# Patient Record
Sex: MALE | Race: WHITE
[De-identification: names, ages, dates, MRNs, and addresses within clinical notes are randomized per-mention and may not be internally consistent; named-entity substitution may affect disease eponyms.]

---

## 2017-09-16 NOTE — EDM.PDOC
ED HPI GENERAL MEDICAL PROBLEM





- General


Chief Complaint: Genitourinary Problem


Stated Complaint: RIGHT FLANK PAIN


Time Seen by Provider: 09/16/17 19:20


Source of Information: Reports: Patient


History Limitations: Reports: No Limitations





- History of Present Illness


INITIAL COMMENTS - FREE TEXT/NARRATIVE: 





This patient is a 62 year old male that presents to the ER. Patient reports 

that for about the last 2 weeks that he has had right flank pain that comes and 

goes. he reports today the pain was worse than what it has. The patient reports 

today he noticed there was a dark color urine that he thought could be blood in 

his urine. The patient does report the pain in his lower right back is worse 

with movement. Patient denies any injury. Patient denies ha, dizziness, v, n, d

, f, cp, soa, abd pain, bowel changes, urinary/bowel incontinence, testicle pain

, penis pain. 


Onset Date: 09/02/17


Duration: Getting Worse


Location: Reports: Back (Right flank)


Severity: Moderate


Improves with: Reports: None


Worsens with: Reports: None


Associated Symptoms: Denies: Confusion, Chest Pain, Cough, cough w sputum, 

Diaphoresis, Fever/Chills, Headaches, Loss of Appetite, Nausea/Vomiting, Rash, 

Seizure, Shortness of Breath, Weakness


  ** Right Flank


Pain Score (Numeric/FACES): 3





- Related Data


 Allergies











Allergy/AdvReac Type Severity Reaction Status Date / Time


 


codeine Allergy  Abdominal Verified 09/16/17 19:18





   Cramps  











Home Meds: 


 Home Meds





Aspirin [Halfprin] 81 mg PO DAILY 06/30/15 [History]


Cholecalciferol (Vitamin D3) [Vitamin D3] 5,000 units PO DAILY 06/30/15 [History

]


Ibuprofen [Advil] 200 mg PO DAILY PRN 06/30/15 [History]


Meloxicam [Meloxicam] 7.5 mg PO DAILY 06/30/15 [History]


Omeprazole [Omeprazole] 20 mg PO DAILY 06/30/15 [History]


Pregabalin [Lyrica] 75 mg PO DAILY 06/30/15 [History]


Simvastatin [Simvastatin] 10 mg PO DAILY 06/30/15 [History]


Acetaminophen [Tylenol] 325 - 650 mg PO Q4H PRN 04/07/16 [History]


Multivitamin [Daily Multiple Vitamin] 1 tab PO DAILY 04/07/16 [History]











Past Medical History


Cardiovascular History: Reports: High Cholesterol


Musculoskeletal History: Reports: Back Pain, Chronic





- Infectious Disease History


Infectious Disease History: Reports: MRSA, Other (See Below)


Other Infectious Disease History: Clearance #1, needs 2 more nasal cxs





- Past Surgical History


GI Surgical History: Reports: Cholecystectomy


Musculoskeletal Surgical History: Reports: Arthroscopic Knee, Arthroscopic 

Procedure





Social & Family History





- Tobacco Use


Smoking Status *Q: Former Smoker


Used Tobacco, but Quit: Yes


Month Tobacco Last Used: 5 YEARS AGO





- Caffeine Use


Caffeine Use: Reports: Coffee





- Recreational Drug Use


Recreational Drug Use: No





- Living Situation & Occupation


Living situation: Reports: 


Occupation: Employed





ED ROS GENERAL





- Review of Systems


Review Of Systems: See Below


Constitutional: Reports: No Symptoms


HEENT: Reports: No Symptoms


Respiratory: Reports: No Symptoms


Cardiovascular: Reports: No Symptoms


Endocrine: Reports: No Symptoms


GI/Abdominal: Reports: No Symptoms


: Reports: Flank Pain (Right), Hematuria


Musculoskeletal: Reports: No Symptoms


Skin: Reports: No Symptoms


Neurological: Reports: No Symptoms


Psychiatric: Reports: No Symptoms


Hematologic/Lymphatic: Reports: No Symptoms


Immunologic: Reports: No Symptoms





ED EXAM, GI/ABD





- Physical Exam


Exam: See Below


Exam Limited By: No Limitations


General Appearance: Alert, WD/WN, No Apparent Distress


Eyes: Bilateral: Normal Appearance


Ears: Normal External Exam, Normal Canal, Hearing Grossly Normal, Normal TMs


Nose: Normal Inspection, Normal Mucosa, No Blood


Throat/Mouth: Normal Inspection, Normal Lips, Normal Teeth, Normal Gums, Normal 

Oropharynx, Normal Voice, No Airway Compromise


Neck: Normal Inspection, Supple, Non-Tender, Full Range of Motion


Respiratory/Chest: No Respiratory Distress, Lungs Clear, Normal Breath Sounds, 

No Accessory Muscle Use


Cardiovascular: Normal Peripheral Pulses, Regular Rate, Rhythm, No Edema, No 

Gallop, No JVD, No Murmur, No Rub


GI/Abdominal Exam: Normal Bowel Sounds, Soft, Non-Tender, No Organomegaly, No 

Distention, No Abnormal Bruit, No Mass


Back Exam: Normal Inspection, Full Range of Motion, CVA Tenderness (R), Other (

Pain Right flank does increase with twisting of the trunk per patient. ).  No: 

CVA Tenderness (L), Decreased Range of Motion, Muscle Spasm, Paraspinal 

Tenderness, Vertebral Tenderness


Extremities: Normal Inspection, Normal Range of Motion, Non-Tender, No Pedal 

Edema, Normal Capillary Refill


Neurological: Alert, Oriented


Psychiatric: Normal Affect, Normal Mood


Skin Exam: Warm, Dry, Intact, Normal Color, No Rash


Lymphatic: No Adenopathy





Course





- Vital Signs


Last Recorded V/S: 


 Last Vital Signs











Temp  97.8 F   09/16/17 19:06


 


Pulse  61   09/16/17 19:06


 


Resp  16   09/16/17 19:06


 


BP  148/66 H  09/16/17 19:06


 


Pulse Ox  94 L  09/16/17 19:06














- Orders/Labs/Meds


Labs: 


 Laboratory Tests











  09/16/17 09/16/17 09/16/17 Range/Units





  19:19 19:19 19:19 


 


WBC  9.1    (5.0-10.0)  10^3/uL


 


RBC  5.08    (4.50-6.00)  10^6/uL


 


Hgb  14.6    (14.0-18.0)  g/dL


 


Hct  44.2    (40.0-54.0)  %


 


MCV  87.0    (82.0-94.0)  fL


 


MCH  28.7    (27.0-32.0)  pg


 


MCHC  33.0    (33.0-38.0)  g/dL


 


RDW Coeff of So  13.5    (11.0-15.0)  %


 


Plt Count  241    (150-400)  10^3/uL


 


Neut % (Auto)  62.8    (35-85)  %


 


Lymph % (Auto)  27.2    (10-55)  %


 


Mono % (Auto)  8.5    (0-16)  %


 


Eos % (Auto)  1.3    (0-5)  %


 


Baso % (Auto)  0.2    (0-3)  %


 


Neut # (Auto)  5.71    (1.80-7.00)  10^3/uL


 


Lymph # (Auto)  2.47    (1.00-4.80)  10^3/uL


 


Mono # (Auto)  0.77    (0.00-0.80)  10^3/uL


 


Eos # (Auto)  0.12    (0.00-0.45)  10^3/uL


 


Baso # (Auto)  0.02    10^3/uL


 


Sodium   141   (136-145)  mEq/L


 


Potassium   3.8   (3.5-5.0)  mEq/L


 


Chloride   105   ()  mEq/L


 


Carbon Dioxide   26   (21-32)  mmol/L


 


BUN   16   (7-18)  mg/dL


 


Creatinine   1.0   (0.7-1.3)  mg/dL


 


Est Cr Clr Drug Dosing   TNP   


 


Estimated GFR (MDRD)   > 60   (>=60)  mL/min


 


Glucose   136 H D   (75-99)  mg/dL


 


Calcium   9.0   (8.4-10.1)  mg/dL


 


Total Bilirubin   0.9   (0.0-1.0)  mg/dL


 


AST   22   (15-37)  U/L


 


ALT   29   (12-78)  U/L


 


Alkaline Phosphatase   103   ()  U/L


 


Total Protein   7.6   (6.4-8.2)  g/dL


 


Albumin   3.8   (3.4-5.0)  g/dL


 


Urine Color    Dark yellow  (YELLOW)  


 


Urine Appearance    Clear  (CLEAR)  


 


Urine pH    6.0  (4.5-8.0)  


 


Ur Specific Gravity    1.025 H  (1.003-1.020)  


 


Urine Protein    Trace H  (NEGATIVE)  mg/dL


 


Urine Glucose (UA)    Negative  (NEGATIVE)  mg/dL


 


Urine Ketones    15 H  (NEGATIVE)  mg/dL


 


Urine Occult Blood    Trace-lysed H  (NEGATIVE)  


 


Urine Nitrite    Negative  (NEGATIVE)  


 


Urine Bilirubin    Negative  (NEGATIVE)  


 


Urine Urobilinogen    1.0  (0.2-1.0)  EU/dL


 


Ur Leukocyte Esterase    Negative  (NEGATIVE)  


 


Urine RBC    0-5  (0-5)  /HPF


 


Urine WBC    Not seen  (0-5)  /HPF


 


Ur Squamous Epith Cells    Occasional H  (NOT SEEN)  /HPF


 


Urine Bacteria    Occasional H  (NOT SEEN)  /HPF


 


Urine Mucus    Moderate H  (NOT SEEN)  /HPF











Meds: 


Medications














Discontinued Medications














Generic Name Dose Route Start Last Admin





  Trade Name Freq  PRN Reason Stop Dose Admin


 


Hydrocodone Bitart/Acetaminophen  3 packet  09/16/17 21:24  09/16/17 22:03





  Take Home: Acetaminophen/Hydrocod, 2 Tab Pack  PO  09/16/17 21:25  3 packet





  ONETIME ONE   Administration


 


Cyclobenzaprine HCl  1 packet  09/16/17 21:25  09/16/17 22:03





  Take Home: Cyclobenzaprine 10 Mg, 4 Tab Pack  PO  09/16/17 21:26  1 packet





  ONETIME ONE   Administration


 


Sodium Chloride  1,000 mls @ 1,000 mls/hr  09/16/17 19:45  





  Normal Saline  IV  09/16/17 20:44  





  .BOLUS ONE   


 


Ketorolac Tromethamine  30 mg  09/16/17 19:45  





  Toradol  IVPUSH  09/16/17 19:46  





  ONETIME ONE   


 


Tamsulosin HCl  0.4 mg  09/16/17 19:45  





  Flomax  PO  09/16/17 19:46  





  ONETIME ONE   














- Radiology Interpretation


Free Text/Narrative:: 





Renal CT; Liver cyst that was present in 2009 scan, no concern. No ureter stone

, no hydronephrosis. Discussed with radiologist. 


CT Results Date: 09/16/17


CT Results Time: 21:22





Departure





- Departure


Time of Disposition: 21:23


Disposition: Home, Self-Care 01


Condition: Good


Clinical Impression: 


Lumbar strain


Qualifiers:


 Encounter type: initial encounter Qualified Code(s): S39.012A - Strain of 

muscle, fascia and tendon of lower back, initial encounter








- Discharge Information


Instructions:  Back Injury Prevention, Easy-to-Read


Referrals: 


Benedicto Rowan MD [Primary Care Provider] - 


Forms:  ED Department Discharge


Additional Instructions: 


Followup with your primary care provider 


Return to the ER for worsening of condition or any emergent concerns


Norco 5/325mg 1-2 pills every 4-6 hours as needed for pain #12 no refill #6 

take home


Flexeril 10mg 1 pill three times a day as needed for muscle spasm #12 no refill 

#4 take home


Increase fluids





- Assessment/Plan


Plan: 





PLEASE SEE RN NOTE FOR PFSH.

## 2018-04-16 NOTE — OR
DATE OF OPERATION:  04/13/2018

 

PREOPERATIVE DIAGNOSIS:  FOLLOW UP POLYPS.

 

POSTOPERATIVE DIAGNOSIS:  FOLLOW UP POLYPS.

 

SURGEON:  Fan Lott MD

 

PROCEDURE:  FULL-LENGTH COLONOSCOPY WITH FORCEPS POLYP REMOVAL X2.

 

ANESTHESIA:  Conscious sedation.

 

COMPLICATIONS:  None.

 

SPECIMEN:  Two small hyperplastic polyps.

 

FINDINGS:

1. Full-length colonoscopy.

2. Hyperplastic polyps x2 distal sigmoid, rectosigmoid junction.

 

RECOMMENDATIONS:  Follow up colonoscopy in 3 years.

 

INDICATIONS:  The patient had a large tubulovillous lesion removed from the

perianal area 2 years ago.  He is overdue for a followup.  He is supposed to

have a one year follow up procedure.  We are at two years.  Dr. Rowan sent him

for colonoscopy.

 

DESCRIPTION OF PROCEDURE:  The patient was prepped and draped, placed in the

left lateral decubitus position.  A lubricated Olympus colonoscope was inserted

and easily advanced to the cecum.  Direct visualization of the ileocecal valve

and appendiceal orifice was accomplished.  Bowel prep was fine.  Upon

withdrawal, the right transverse and descending colons were benign.  In the

sigmoid area, the patient had no signs of any vascular abnormalities, colitis,

diverticula, polyps, mass or otherwise.  At the rectosigmoid junction and into

the proximal rectal vault, the patient had 2 small flat hyperplastic polyps,

both removed with forceps in their entirety without complication.  The rectal

vault appeared benign.  Retroflexion did get an excellent look at the perianal

area, it showed no recurrence of the

polyp or residual polyp left from his prior removal.  Air was then suctioned.

Scope was removed without complication.

 

DIANNA/RIGO

DD:  04/13/2018 10:14:05

DT:  04/13/2018 18:18:17

Job #:  [id number]

## 2019-11-21 ENCOUNTER — HOSPITAL ENCOUNTER (EMERGENCY)
Dept: HOSPITAL 38 - CC.ED | Age: 64
Discharge: HOME | End: 2019-11-21
Payer: COMMERCIAL

## 2019-11-21 VITALS — HEART RATE: 53 BPM | DIASTOLIC BLOOD PRESSURE: 79 MMHG | SYSTOLIC BLOOD PRESSURE: 132 MMHG

## 2019-11-21 DIAGNOSIS — E78.00: ICD-10-CM

## 2019-11-21 DIAGNOSIS — Z90.49: ICD-10-CM

## 2019-11-21 DIAGNOSIS — Z88.5: ICD-10-CM

## 2019-11-21 DIAGNOSIS — K21.9: Primary | ICD-10-CM

## 2019-11-21 DIAGNOSIS — Z79.899: ICD-10-CM

## 2019-11-21 DIAGNOSIS — Z79.82: ICD-10-CM

## 2019-11-21 DIAGNOSIS — Z87.891: ICD-10-CM

## 2019-11-21 LAB
CHLORIDE SERPL-SCNC: 106 MEQ/L (ref 98–106)
SODIUM SERPL-SCNC: 142 MEQ/L (ref 136–145)

## 2019-11-21 NOTE — EDM.PDOC
ED HPI GENERAL MEDICAL PROBLEM





- General


Chief Complaint: Chest Pain


Stated Complaint: chest pain


Time Seen by Provider: 11/21/19 11:15





- History of Present Illness


INITIAL COMMENTS - FREE TEXT/NARRATIVE: 





Was at work when he developed central chest pain that radiated up into his jaw.

  He did take 4 baby ASA.  He felt nauseated for a short time.  He does have 

history of GERD and has been taking his meds.  Wife relates that he sees Dr. Rowan and this past summer he was having some pain like this and he had stress 

test and then had a cardiolyte both of which were negative.  He has had vita 

in the past.  Did not eat anything unusual today.  When he arrived in the ER he 

states that his pain was down to a 1.


Onset: Today


Duration: Improving


Location: Reports: Chest


Treatments PTA: Reports: Aspirin


  ** Middle Chest


Pain Score (Numeric/FACES): 1





- Related Data


 Allergies











Allergy/AdvReac Type Severity Reaction Status Date / Time


 


codeine Allergy  Abdominal Verified 11/21/19 10:38





   Cramps  











Home Meds: 


 Home Meds





Aspirin [Halfprin] 81 mg PO DAILY 06/30/15 [History]


Cholecalciferol (Vitamin D3) [Vitamin D3] 5,000 units PO DAILY 06/30/15 [History

]


Ibuprofen [Advil] 200 mg PO DAILY PRN 06/30/15 [History]


Meloxicam 7.5 mg PO DAILY 06/30/15 [History]


Simvastatin 10 mg PO DAILY 06/30/15 [History]


Acetaminophen [Tylenol] 325 - 650 mg PO Q4H PRN 04/07/16 [History]


Multivitamin [Daily Multiple Vitamin] 1 tab PO DAILY 04/07/16 [History]


Naproxen Sodium [Aleve] 220 mg PO BID PRN 04/12/18 [History]


Tamsulosin [Tamsulosin 24 Hr] 0.4 mg PO DAILY 04/12/18 [History]


Folic Acid 1 mg PO DAILY 04/13/18 [History]


Metoprolol Succinate 50 mg PO DAILY 11/21/19 [History]











Past Medical History


HEENT History: Reports: Other (See Below)


Cardiovascular History: Reports: High Cholesterol


Genitourinary History: Reports: Other (See Below)


Other Genitourinary History: enlarged prostate


Musculoskeletal History: Reports: Back Pain, Chronic


Oncologic (Cancer) History: Reports: Other (See Below)


Other Oncologic History: lip cancer





- Infectious Disease History


Infectious Disease History: Reports: C-Difficile, MRSA, Other (See Below)


Other Infectious Disease History: Clearance #1, needs 2 more nasal cxs





- Past Surgical History


Other HEENT Surgeries/Procedures: sinus surgery


GI Surgical History: Reports: Cholecystectomy


Musculoskeletal Surgical History: Reports: Arthroscopic Knee, Arthroscopic 

Procedure





Social & Family History





- Tobacco Use


Smoking Status *Q: Former Smoker


Years of Tobacco use: 10


Packs/Tins Daily: 0.5


Used Tobacco, but Quit: Yes


Month/Year Tobacco Last Used: 10





- Caffeine Use


Caffeine Use: Reports: Coffee





- Recreational Drug Use


Recreational Drug Use: No





- Living Situation & Occupation


Living situation: Reports: 


Occupation: Employed





ED ROS GENERAL





- Review of Systems


Review Of Systems: See Below


Constitutional: Denies: Fever, Chills, Weakness


HEENT: Reports: No Symptoms


Respiratory: Denies: Shortness of Breath


Cardiovascular: Reports: Chest Pain.  Denies: Edema, Lightheadedness


GI/Abdominal: Reports: Other (heart burn)


Musculoskeletal: Reports: No Symptoms


Skin: Reports: No Symptoms


Neurological: Reports: No Symptoms





ED EXAM, GENERAL





- Physical Exam


Exam: See Below


Exam Limited By: No Limitations


General Appearance: Alert, WD/WN, Mild Distress


Ears: Normal External Exam, Normal TMs


Throat/Mouth: Normal Inspection, Normal Oropharynx, No Airway Compromise


Head: Atraumatic, Normocephalic


Neck: Normal Inspection, Supple, Non-Tender, Full Range of Motion


Respiratory/Chest: No Respiratory Distress, Lungs Clear, Normal Breath Sounds


Cardiovascular: Normal Peripheral Pulses, Regular Rate, Rhythm, No Edema


GI/Abdominal: Normal Bowel Sounds, Soft, Tender (to the midepigastric area with 

any palpation.)


Back Exam: Normal Inspection


Extremities: Normal Inspection, No Pedal Edema


Neurological: Alert, Oriented


Psychiatric: Normal Affect


Skin Exam: Warm, Dry, Intact





Course





- Vital Signs


Last Recorded V/S: 


 Last Vital Signs











Temp  96 F   11/21/19 11:14


 


Pulse  53 L  11/21/19 11:14


 


Resp  10 L  11/21/19 11:14


 


BP  132/79   11/21/19 11:14


 


Pulse Ox  93 L  11/21/19 10:59














- Orders/Labs/Meds


Orders: 


 Active Orders 24 hr











 Category Date Time Status


 


 Chest 2V [CR] Stat Exams  11/21/19 09:46 Taken











Labs: 


 Laboratory Tests











  11/21/19 11/21/19 11/21/19 Range/Units





  10:51 10:51 10:51 


 


WBC  8.5    (5.0-10.0)  10^3/uL


 


RBC  4.74    (4.50-6.00)  10^6/uL


 


Hgb  14.0    (14.0-18.0)  g/dL


 


Hct  42.6    (40.0-54.0)  %


 


MCV  89.9    (82.0-94.0)  fL


 


MCH  29.5    (27.0-32.0)  pg


 


MCHC  32.9 L    (33.0-38.0)  g/dL


 


RDW Coeff of So  12.7    (11.0-15.0)  %


 


Plt Count  276    (150-400)  10^3/uL


 


Neut % (Auto)  63.2    (35-85)  %


 


Lymph % (Auto)  24.1    (10-55)  %


 


Mono % (Auto)  10.0    (0-16)  %


 


Eos % (Auto)  2.2    (0-5)  %


 


Baso % (Auto)  0.5    (0-3)  %


 


Neut # (Auto)  5.39    (1.80-7.00)  10^3/uL


 


Lymph # (Auto)  2.05    (1.00-4.80)  10^3/uL


 


Mono # (Auto)  0.85 H    (0.00-0.80)  10^3/uL


 


Eos # (Auto)  0.19    (0.00-0.45)  10^3/uL


 


Baso # (Auto)  0.04    10^3/uL


 


PT   10.6   (9.7-12.3)  SEC


 


INR   1.03   (0.92-1.18)  


 


APTT   27.3   (23.2-32.3)  SEC


 


Sodium    142  (136-145)  mEq/L


 


Potassium    4.3  (3.5-5.0)  mEq/L


 


Chloride    106  ()  mEq/L


 


Carbon Dioxide    27  (21-32)  mmol/L


 


BUN    15  (7-18)  mg/dL


 


Creatinine    0.8  (0.7-1.3)  mg/dL


 


Est Cr Clr Drug Dosing    91.44  mL/min


 


Estimated GFR (MDRD)    > 60  (>=60)  mL/min


 


Glucose    101 H  (75-99)  mg/dL


 


Calcium    9.5  (8.4-10.1)  mg/dL


 


Lactate Dehydrogenase    201 H  (100-190)  U/L


 


Creatine Kinase    165  ()  U/L


 


Troponin I    < 0.017  (0.00-0.06)  ng/mL














- Re-Assessments/Exams


Free Text/Narrative Re-Assessment/Exam: 





11/21/19 1100 Discussed normal lab results, EKG and relief of pain.  Discussed 

that with normal cardiolyte within last 4-6 months it is unlikely this is 

cardiac related.  Continue with the protonix.  IF pain reoccurs then would need 

to follow up with PCP for possible EGD and other testing.  Wife and pt both 

voice understanding.  Will start Carafate for 2 weeks at this time.








Departure





- Departure


Time of Disposition: 11:14


Disposition: Home, Self-Care 01


Condition: Good


Clinical Impression: 


Gastroesophageal reflux disease


Qualifiers:


 Esophagitis presence: esophagitis presence not specified Qualified Code(s): 

K21.9 - Gastro-esophageal reflux disease without esophagitis





Referrals: 


Daquan Story PA-C [Physician Assistant] - 


Forms:  ED Department Discharge


Additional Instructions: 


Carafate 1 gram four times a day for 2 weeks.


Follow up with Dr. Rowan if symptoms reoccur








- Problem List & Annotations


(1) Gastroesophageal reflux disease


SNOMED Code(s): 638643013


   Code(s): K21.9 - GASTRO-ESOPHAGEAL REFLUX DISEASE WITHOUT ESOPHAGITIS   

Status: Acute   Priority: High   


Qualifiers: 


   Esophagitis presence: esophagitis presence not specified   Qualified Code(s)

: K21.9 - Gastro-esophageal reflux disease without esophagitis   





(2) Chest pain of uncertain etiology


SNOMED Code(s): 32027393


   Code(s): R07.89 - OTHER CHEST PAIN   Status: Acute   Priority: High   





- Problem List Review


Problem List Initiated/Reviewed/Updated: Yes





- My Orders


Last 24 Hours: 


My Active Orders





11/21/19 09:46


Chest 2V [CR] Stat 














- Assessment/Plan


Last 24 Hours: 


My Active Orders





11/21/19 09:46


Chest 2V [CR] Stat

## 2022-06-12 ENCOUNTER — HOSPITAL ENCOUNTER (EMERGENCY)
Dept: HOSPITAL 38 - CC.ED | Age: 67
Discharge: HOME | End: 2022-06-12
Payer: COMMERCIAL

## 2022-06-12 VITALS — DIASTOLIC BLOOD PRESSURE: 79 MMHG | HEART RATE: 73 BPM | SYSTOLIC BLOOD PRESSURE: 131 MMHG

## 2022-06-12 DIAGNOSIS — Z96.651: ICD-10-CM

## 2022-06-12 DIAGNOSIS — Z87.891: ICD-10-CM

## 2022-06-12 DIAGNOSIS — Z79.82: ICD-10-CM

## 2022-06-12 DIAGNOSIS — R21: ICD-10-CM

## 2022-06-12 DIAGNOSIS — M25.561: Primary | ICD-10-CM

## 2022-06-12 DIAGNOSIS — E78.00: ICD-10-CM

## 2022-06-12 DIAGNOSIS — Z79.899: ICD-10-CM

## 2022-06-12 DIAGNOSIS — Z88.5: ICD-10-CM

## 2022-06-12 DIAGNOSIS — I10: ICD-10-CM

## 2022-06-12 LAB
CHLORIDE SERPL-SCNC: 104 MEQ/L (ref 98–106)
EGFRCR SERPLBLD CKD-EPI 2021: > 60 ML/MIN (ref 60–?)
SODIUM SERPL-SCNC: 142 MEQ/L (ref 136–145)

## 2022-06-12 RX ADMIN — TRAMADOL HYDROCHLORIDE ONE PACKET: 50 TABLET, FILM COATED ORAL at 18:59
